# Patient Record
Sex: MALE | Race: WHITE | NOT HISPANIC OR LATINO | Employment: OTHER | URBAN - METROPOLITAN AREA
[De-identification: names, ages, dates, MRNs, and addresses within clinical notes are randomized per-mention and may not be internally consistent; named-entity substitution may affect disease eponyms.]

---

## 2017-06-27 ENCOUNTER — OFFICE VISIT (OUTPATIENT)
Dept: ORTHOPEDICS | Facility: OTHER | Age: 72
End: 2017-06-27
Attending: ORTHOPAEDIC SURGERY
Payer: COMMERCIAL

## 2017-06-27 VITALS
HEIGHT: 69 IN | TEMPERATURE: 97.5 F | DIASTOLIC BLOOD PRESSURE: 74 MMHG | SYSTOLIC BLOOD PRESSURE: 142 MMHG | HEART RATE: 73 BPM | BODY MASS INDEX: 25.77 KG/M2 | OXYGEN SATURATION: 93 % | RESPIRATION RATE: 18 BRPM | WEIGHT: 174 LBS

## 2017-06-27 DIAGNOSIS — M23.203 OLD TEAR OF MEDIAL MENISCUS OF RIGHT KNEE, UNSPECIFIED TEAR TYPE: Primary | ICD-10-CM

## 2017-06-27 PROCEDURE — 99212 OFFICE O/P EST SF 10 MIN: CPT | Mod: 25 | Performed by: ORTHOPAEDIC SURGERY

## 2017-06-27 PROCEDURE — 20610 DRAIN/INJ JOINT/BURSA W/O US: CPT | Mod: RT | Performed by: ORTHOPAEDIC SURGERY

## 2017-06-27 RX ORDER — DEXAMETHASONE SODIUM PHOSPHATE 4 MG/ML
4 INJECTION, SOLUTION INTRA-ARTICULAR; INTRALESIONAL; INTRAMUSCULAR; INTRAVENOUS; SOFT TISSUE ONCE
Qty: 1 ML | Refills: 0 | OUTPATIENT
Start: 2017-06-27 | End: 2017-06-27

## 2017-06-27 ASSESSMENT — PAIN SCALES - GENERAL: PAINLEVEL: MODERATE PAIN (5)

## 2017-06-27 NOTE — MR AVS SNAPSHOT
"              After Visit Summary   2017    Don Sosa    MRN: [id number]           Patient Information     Date Of Birth          1945        Visit Information        Provider Department      2017 11:20 AM Diaz Alexander MD [facility]        Today's Diagnoses     Old tear of medial meniscus of right knee, unspecified tear type    -  1       Follow-ups after your visit        Who to contact     If you have questions or need follow up information about today's clinic visit or your schedule please contact [facility] directly at [phone].  Normal or non-critical lab and imaging results will be communicated to you by [employer], letter or phone within 4 business days after the clinic has received the results. If you do not hear from us within 7 days, please contact the clinic through [employer] or phone. If you have a critical or abnormal lab result, we will notify you by phone as soon as possible.  Submit refill requests through [employer] or call your pharmacy and they will forward the refill request to us. Please allow 3 business days for your refill to be completed.          Additional Information About Your Visit        MyChart Information     [employer] lets you send messages to your doctor, view your test results, renew your prescriptions, schedule appointments and more. To sign up, go to [email or web address] . Click on \"Log in\" on the left side of the screen, which will take you to the Welcome page. Then click on \"Sign up Now\" on the right side of the page.     You will be asked to enter the access code listed below, as well as some personal information. Please follow the directions to create your username and password.     Your access code is: [email or web address]  Expires: 2017 11:55 AM     Your access code will  in 90 days. If you need help or a new code, please call your [facility] or [phone].        Care EveryWhere ID     This is your Care " "EveryWhere ID. This could be used by other organizations to access your [facility] medical records  [id number]        Your Vitals Were     Pulse Temperature Respirations Height Pulse Oximetry BMI (Body Mass Index)    73 97.5  F (36.4  C) (Tympanic) 18 5' 9\" (1.753 m) 93% 25.7 kg/m2       Blood Pressure from Last 3 Encounters:   06/27/17 142/74   08/16/16 124/84    Weight from Last 3 Encounters:   06/27/17 174 lb (78.9 kg)   08/16/16 170 lb (77.1 kg)              We Performed the Following     DEXAMETHASONE SODIUM PHOS PER 1 MG     Large Joint/Bursa injection and/or drainage (Shoulder, Knee)          Today's Medication Changes          These changes are accurate as of: 6/27/17 11:55 AM.  If you have any questions, ask your nurse or doctor.               These medicines have changed or have updated prescriptions.        Dose/Directions    * dexamethasone 4 MG/ML injection   Commonly known as:  DECADRON   This may have changed:  Another medication with the same name was added. Make sure you understand how and when to take each.   Used for:  Old tear of medial meniscus of right knee   Changed by:  Diaz Alexander MD        Dose:  4 mg   Inject 1 mL (4 mg) as directed once for 1 dose Use 4 mg or dose determined by provider for iontophoresis.   Quantity:  1 mL   Refills:  0       * dexamethasone 4 MG/ML injection   Commonly known as:  DECADRON   This may have changed:  You were already taking a medication with the same name, and this prescription was added. Make sure you understand how and when to take each.   Used for:  Old tear of medial meniscus of right knee, unspecified tear type   Changed by:  Diaz Alexander MD        Dose:  4 mg   Inject 1 mL (4 mg) as directed once for 1 dose Use 4 mg or dose determined by provider for iontophoresis.   Quantity:  1 mL   Refills:  0       * Notice:  This list has 2 medication(s) that are the same as other medications prescribed for you. Read the directions carefully, and " ask your doctor or other care provider to review them with you.         Where to get your medicines      Some of these will need a paper prescription and others can be bought over the counter.  Ask your nurse if you have questions.     You don't need a prescription for these medications     dexamethasone 4 MG/ML injection                Primary Care Provider    None Specified       No primary provider on file.        Equal Access to Services     ALICIA MARTIN : Hadangie Saxena, waaxda jaquelineadaha, qaybta nilamaoscar kay, tay carrera. So [facility] [phone].    ATENCIÓN: Si habla español, tiene a frost disposición servicios gratuitos de asistencia lingüística. Llame al [phone].    We comply with applicable federal civil rights laws and [place] laws. We do not discriminate on the basis of race, color, national origin, age, disability sex, sexual orientation or gender identity.            Thank you!     Thank you for choosing [facility]  for your care. Our goal is always to provide you with excellent care. Hearing back from our patients is one way we can continue to improve our services. Please take a few minutes to complete the written survey that you may receive in the mail after your visit with us. Thank you!             Your Updated Medication List - Protect others around you: Learn how to safely use, store and throw away your medicines at www.disposemymeds.org.          This list is accurate as of: 6/27/17 11:55 AM.  Always use your most recent med list.                   Brand Name Dispense Instructions for use Diagnosis    albuterol 108 (90 BASE) MCG/ACT Inhaler    PROAIR HFA/PROVENTIL HFA/VENTOLIN HFA     Inhale 2 puffs into the lungs every 6 hours        ASPIRIN PO      Take 81 mg by mouth daily        budesonide-formoterol 160-4.5 MCG/ACT Inhaler    SYMBICORT     Inhale 2 puffs into the lungs 2 times daily        * dexamethasone 4 MG/ML injection     DECADRON    1 mL    Inject 1 mL (4 mg) as directed once for 1 dose Use 4 mg or dose determined by provider for iontophoresis.    Old tear of medial meniscus of right knee       * dexamethasone 4 MG/ML injection    DECADRON    1 mL    Inject 1 mL (4 mg) as directed once for 1 dose Use 4 mg or dose determined by provider for iontophoresis.    Old tear of medial meniscus of right knee, unspecified tear type       MIRTAZAPINE PO      Take 15 mg by mouth daily        MONTELUKAST SODIUM PO      Take 10 mg by mouth daily        NITROSTAT SL      Place 0.4 mg under the tongue        PRIMIDONE PO      Take 250 mg by mouth 2 times daily        SIMVASTATIN PO      Take 40 mg by mouth daily        VITAMIN C PO      Take 500 mg by mouth daily        VITAMIN D (CHOLECALCIFEROL) PO      Take 1,000 Units by mouth daily        * Notice:  This list has 2 medication(s) that are the same as other medications prescribed for you. Read the directions carefully, and ask your doctor or other care provider to review them with you.

## 2017-06-27 NOTE — NURSING NOTE
"Chief Complaint   Patient presents with     Musculoskeletal Problem     Right knee pain       Initial /74 (BP Location: Right arm, Patient Position: Chair, Cuff Size: Adult Large)  Pulse 73  Temp 97.5  F (36.4  C) (Tympanic)  Resp 18  Ht 5' 9\" (1.753 m)  Wt 174 lb (78.9 kg)  SpO2 93%  BMI 25.7 kg/m2 Estimated body mass index is 25.7 kg/(m^2) as calculated from the following:    Height as of this encounter: 5' 9\" (1.753 m).    Weight as of this encounter: 174 lb (78.9 kg).  Medication Reconciliation: unable or not appropriate to perform   Gem Savage LPN      "

## 2017-06-27 NOTE — PROGRESS NOTES
Chief complaint: Medial meniscal tear right knee    Subjective: This 72-year-old right-handed retired  has a two-year history of right knee pain of insidious onset.  [facility] and [facility] which gave him long-lasting symptom relief.  He presented to me for the 1st time on 8/16/16.  X-rays at that time were negative.  [facility] was not available to me.  All he wanted was another steroid injection.  One was therefore performed.  He returns today stating that he got almost a year of symptom relief from it but the symptoms have returned and so he wants another injection. He denies a recent right knee.    His present pain is along the medial side of the knee, intermittent, provoked by weightbearing activities, and lessened by nonweightbearing.  The knee does not swell or give out.    Nothing is changed with respect to his musculoskeletal or neurologic review of systems since seen last.    Examination: Healthy-appearing male with appropriate mood and affect.  The skin around the right knee is intact.  The knee has no effusion or deformity.  There is no palpable lymph node or Baker cyst behind it.  It is tender to palpation only along the medial joint line.  Range of motion is 5-112  with mild discomfort at both extremes.  The knee is stable to ligamentous stressing.  Ary's maneuvers are negative.  Extension strength is normal.  The neurovascular status the right ankle is intact.    Impression: Chronic medial meniscal tear right knee    Plan: We will repeat the steroid injection today.  Return PRN. If this lasts him until next year, we will get new right knee films on arrival next year.    Procedure: After obtaining written informed consent and sterilely prepping the site a timeout was held.  Sterile technique was employed as the right knee was then injected with 4 mg of dexamethasone mixed with 3mL of Carbocaine.  Topical ethyl  chloride spray was used as a local anesthetic.  The patient tolerated the procedure well and there were no complications.

## 2017-11-29 ENCOUNTER — TRANSFERRED RECORDS (OUTPATIENT)
Dept: HEALTH INFORMATION MANAGEMENT | Facility: HOSPITAL | Age: 72
End: 2017-11-29

## 2017-11-30 ENCOUNTER — TRANSFERRED RECORDS (OUTPATIENT)
Dept: HEALTH INFORMATION MANAGEMENT | Facility: HOSPITAL | Age: 72
End: 2017-11-30

## 2017-12-05 ENCOUNTER — ANESTHESIA (OUTPATIENT)
Dept: SURGERY | Facility: HOSPITAL | Age: 72
End: 2017-12-05
Payer: MEDICARE

## 2017-12-05 ENCOUNTER — HOSPITAL ENCOUNTER (OUTPATIENT)
Facility: HOSPITAL | Age: 72
Discharge: HOME | End: 2017-12-05
Attending: OPHTHALMOLOGY | Admitting: OPHTHALMOLOGY
Payer: MEDICARE

## 2017-12-05 ENCOUNTER — ANESTHESIA EVENT (OUTPATIENT)
Dept: SURGERY | Facility: HOSPITAL | Age: 72
End: 2017-12-05
Payer: MEDICARE

## 2017-12-05 VITALS
BODY MASS INDEX: 27.08 KG/M2 | WEIGHT: 182.8 LBS | HEIGHT: 69 IN | TEMPERATURE: 98.2 F | OXYGEN SATURATION: 94 % | DIASTOLIC BLOOD PRESSURE: 81 MMHG | RESPIRATION RATE: 18 BRPM | SYSTOLIC BLOOD PRESSURE: 154 MMHG

## 2017-12-05 PROCEDURE — 25000125 ZZHC RX 250: Performed by: OPHTHALMOLOGY

## 2017-12-05 PROCEDURE — 37000009 ZZH ANESTHESIA TECHNICAL FEE, EACH ADDTL 15 MIN: Performed by: OPHTHALMOLOGY

## 2017-12-05 PROCEDURE — 99100 ANES PT EXTEME AGE<1 YR&>70: CPT | Performed by: NURSE ANESTHETIST, CERTIFIED REGISTERED

## 2017-12-05 PROCEDURE — 71000027 ZZH RECOVERY PHASE 2 EACH 15 MINS: Performed by: OPHTHALMOLOGY

## 2017-12-05 PROCEDURE — 66984 XCAPSL CTRC RMVL W/O ECP: CPT | Performed by: NURSE ANESTHETIST, CERTIFIED REGISTERED

## 2017-12-05 PROCEDURE — 37000008 ZZH ANESTHESIA TECHNICAL FEE, 1ST 30 MIN: Performed by: OPHTHALMOLOGY

## 2017-12-05 PROCEDURE — 27210794 ZZH OR GENERAL SUPPLY STERILE: Performed by: OPHTHALMOLOGY

## 2017-12-05 PROCEDURE — 36000056 ZZH SURGERY LEVEL 3 1ST 30 MIN: Performed by: OPHTHALMOLOGY

## 2017-12-05 PROCEDURE — 25000128 H RX IP 250 OP 636: Performed by: NURSE ANESTHETIST, CERTIFIED REGISTERED

## 2017-12-05 PROCEDURE — 40000305 ZZH STATISTIC PRE PROC ASSESS I: Performed by: OPHTHALMOLOGY

## 2017-12-05 PROCEDURE — 66984 XCAPSL CTRC RMVL W/O ECP: CPT | Performed by: ANESTHESIOLOGY

## 2017-12-05 PROCEDURE — V2632 POST CHMBR INTRAOCULAR LENS: HCPCS | Performed by: OPHTHALMOLOGY

## 2017-12-05 PROCEDURE — C9447 INJ, PHENYLEPHRINE KETOROLAC: HCPCS | Performed by: OPHTHALMOLOGY

## 2017-12-05 PROCEDURE — 36000058 ZZH SURGERY LEVEL 3 EA 15 ADDTL MIN: Performed by: OPHTHALMOLOGY

## 2017-12-05 PROCEDURE — 25000128 H RX IP 250 OP 636: Performed by: OPHTHALMOLOGY

## 2017-12-05 DEVICE — IMPLANTABLE DEVICE: Type: IMPLANTABLE DEVICE | Site: EYE | Status: FUNCTIONAL

## 2017-12-05 RX ORDER — FLURBIPROFEN SODIUM 0.3 MG/ML
1 SOLUTION/ DROPS OPHTHALMIC
Status: DISCONTINUED | OUTPATIENT
Start: 2017-12-05 | End: 2017-12-05 | Stop reason: HOSPADM

## 2017-12-05 RX ORDER — CYCLOPENTOLATE HYDROCHLORIDE 10 MG/ML
1 SOLUTION/ DROPS OPHTHALMIC
Status: COMPLETED | OUTPATIENT
Start: 2017-12-05 | End: 2017-12-05

## 2017-12-05 RX ORDER — TETRACAINE HYDROCHLORIDE 5 MG/ML
SOLUTION OPHTHALMIC PRN
Status: DISCONTINUED | OUTPATIENT
Start: 2017-12-05 | End: 2017-12-05 | Stop reason: HOSPADM

## 2017-12-05 RX ORDER — MOXIFLOXACIN 5 MG/ML
1 SOLUTION/ DROPS OPHTHALMIC
Status: ACTIVE | OUTPATIENT
Start: 2017-12-05 | End: 2017-12-05

## 2017-12-05 RX ORDER — MEPERIDINE HYDROCHLORIDE 25 MG/ML
12.5 INJECTION INTRAMUSCULAR; INTRAVENOUS; SUBCUTANEOUS
Status: DISCONTINUED | OUTPATIENT
Start: 2017-12-05 | End: 2017-12-05 | Stop reason: HOSPADM

## 2017-12-05 RX ORDER — MOXIFLOXACIN 5 MG/ML
1 SOLUTION/ DROPS OPHTHALMIC ONCE
Status: COMPLETED | OUTPATIENT
Start: 2017-12-05 | End: 2017-12-05

## 2017-12-05 RX ORDER — SODIUM CHLORIDE, SODIUM LACTATE, POTASSIUM CHLORIDE, CALCIUM CHLORIDE 600; 310; 30; 20 MG/100ML; MG/100ML; MG/100ML; MG/100ML
INJECTION, SOLUTION INTRAVENOUS CONTINUOUS
Status: DISCONTINUED | OUTPATIENT
Start: 2017-12-05 | End: 2017-12-05 | Stop reason: HOSPADM

## 2017-12-05 RX ORDER — PHENYLEPHRINE HYDROCHLORIDE 25 MG/ML
1 SOLUTION/ DROPS OPHTHALMIC
Status: COMPLETED | OUTPATIENT
Start: 2017-12-05 | End: 2017-12-05

## 2017-12-05 RX ORDER — CYCLOPENTOLATE HYDROCHLORIDE 10 MG/ML
1 SOLUTION/ DROPS OPHTHALMIC
Status: DISCONTINUED | OUTPATIENT
Start: 2017-12-05 | End: 2017-12-05 | Stop reason: HOSPADM

## 2017-12-05 RX ORDER — PHENYLEPHRINE HYDROCHLORIDE 25 MG/ML
1 SOLUTION/ DROPS OPHTHALMIC
Status: DISCONTINUED | OUTPATIENT
Start: 2017-12-05 | End: 2017-12-05 | Stop reason: HOSPADM

## 2017-12-05 RX ORDER — PROPARACAINE HYDROCHLORIDE 5 MG/ML
1 SOLUTION/ DROPS OPHTHALMIC ONCE
Status: COMPLETED | OUTPATIENT
Start: 2017-12-05 | End: 2017-12-05

## 2017-12-05 RX ORDER — FLURBIPROFEN SODIUM 0.3 MG/ML
1 SOLUTION/ DROPS OPHTHALMIC ONCE
Status: COMPLETED | OUTPATIENT
Start: 2017-12-05 | End: 2017-12-05

## 2017-12-05 RX ORDER — PREDNISOLONE ACETATE 10 MG/ML
SUSPENSION/ DROPS OPHTHALMIC PRN
Status: DISCONTINUED | OUTPATIENT
Start: 2017-12-05 | End: 2017-12-05 | Stop reason: HOSPADM

## 2017-12-05 RX ORDER — ONDANSETRON 2 MG/ML
4 INJECTION INTRAMUSCULAR; INTRAVENOUS EVERY 30 MIN PRN
Status: DISCONTINUED | OUTPATIENT
Start: 2017-12-05 | End: 2017-12-05 | Stop reason: HOSPADM

## 2017-12-05 RX ORDER — PROPARACAINE HYDROCHLORIDE 5 MG/ML
1 SOLUTION/ DROPS OPHTHALMIC ONCE
Status: DISCONTINUED | OUTPATIENT
Start: 2017-12-05 | End: 2017-12-05 | Stop reason: HOSPADM

## 2017-12-05 RX ORDER — NALOXONE HYDROCHLORIDE 0.4 MG/ML
.1-.4 INJECTION, SOLUTION INTRAMUSCULAR; INTRAVENOUS; SUBCUTANEOUS
Status: DISCONTINUED | OUTPATIENT
Start: 2017-12-05 | End: 2017-12-05 | Stop reason: HOSPADM

## 2017-12-05 RX ORDER — ONDANSETRON 4 MG/1
4 TABLET, ORALLY DISINTEGRATING ORAL EVERY 30 MIN PRN
Status: DISCONTINUED | OUTPATIENT
Start: 2017-12-05 | End: 2017-12-05 | Stop reason: HOSPADM

## 2017-12-05 RX ADMIN — CYCLOPENTOLATE HYDROCHLORIDE 1 DROP: 10 SOLUTION/ DROPS OPHTHALMIC at 07:39

## 2017-12-05 RX ADMIN — MOXIFLOXACIN HYDROCHLORIDE 1 DROP: 5 SOLUTION/ DROPS OPHTHALMIC at 07:36

## 2017-12-05 RX ADMIN — MIDAZOLAM HYDROCHLORIDE 1 MG: 1 INJECTION, SOLUTION INTRAMUSCULAR; INTRAVENOUS at 08:02

## 2017-12-05 RX ADMIN — PROPARACAINE HYDROCHLORIDE 1 DROP: 5 SOLUTION/ DROPS OPHTHALMIC at 07:36

## 2017-12-05 RX ADMIN — FLURBIPROFEN SODIUM 1 DROP: 0.3 SOLUTION/ DROPS OPHTHALMIC at 07:38

## 2017-12-05 RX ADMIN — PHENYLEPHRINE HYDROCHLORIDE 1 DROP: 25 SOLUTION/ DROPS OPHTHALMIC at 07:39

## 2017-12-05 RX ADMIN — PHENYLEPHRINE HYDROCHLORIDE 1 DROP: 25 SOLUTION/ DROPS OPHTHALMIC at 07:44

## 2017-12-05 RX ADMIN — CYCLOPENTOLATE HYDROCHLORIDE 1 DROP: 10 SOLUTION/ DROPS OPHTHALMIC at 07:45

## 2017-12-05 ASSESSMENT — COPD QUESTIONNAIRES: COPD: 1

## 2017-12-05 ASSESSMENT — LIFESTYLE VARIABLES: TOBACCO_USE: 1

## 2017-12-05 NOTE — OP NOTE
Ophthalmology Operative Note    DATE OF SERVICE:  12/5/2017.     PREOPERATIVE DIAGNOSIS: Nuclear sclerotic cataract, Left eye.       POSTOPERATIVE DIAGNOSIS: Nuclear sclerotic cataract, Left eye.       SURGEON: Ludin Kruse MD    PROCEDURE:  Phacoemulsification with intraocular lens implant, Model PCB00, 14.00 diopter power.     ANESTHESIA:  Topical with IV sedation. Combined MAC with Topical.       INDICATION: Don Sosa is a 72 year old male with a history of a visually significant cataract of the Left eye that is interfering with activities of daily living.      DESCRIPTION OF PROCEDURE:   Operative risks, benefits and alternatives to the procedure were discussed at length with Don Sosa including infection, bleeding, retinal detachment, loss of vision and loss of the eye.  Patient voiced understanding and informed consent was signed.  The patient was taken to the operating suite, where an appropriate level of anesthesia was given.  Attention was placed to the operative eye.  The patient was then prepped and draped in the usual sterile ophthalmic manner with 5% povidone iodine solution.  A lid speculum was placed in the eye to provide exposure and MVR blade was used to make a paracentesis.  Once this was performed, Shugarcaine was then placed intracamerally.  This was then followed by intracameral Viscoat.  A 2.4 mm blade was then used to make a biplanar temporal clear corneal incision.  A cystotome and uttrata were used to make a continuous curvilinear capsulorrhexis. Balanced salt solution on flat cannula was then used to hydrodissect the lens.  Phacoemulsification was then performed in a divide-and-conquer technique to remove all pieces of the nucleus.  Irrigation aspiration was then used to remove all remaining cortical material and debris.  Amvisc was then used to fill the capsular bag.  A PCB00 14.00 diopter lens was then injected into the capsular bag using the injector for a target  refraction of -0.49.  Once this was performed, a secondary instrument was used to rotate the lens into proper position.  Irrigation aspiration was then used to remove all remaining viscoelastic material and center the lens appropriately.  Balanced salt solution on 30 gauge cannula was then used to hydrate both wounds.  A Weck-Yolie was used to assess intraocular pressure and wound leakage.  Once this was stable and the lens was found to be in good position, the patient was undraped.  The patient was brought to the recovery area in stable condition.  Family was made aware of the patient's condition and the patient will follow up with us later this afternoon.  No complications.     IMPLANTS:    Implant Name Type Inv. Item Serial No.  Lot No. LRB No. Used   LENS-TECNIS PCB00 14.0 PRELOADED   LENS-TECNIS PCB00 14.0 PRELOADED [id number]     Left 1

## 2017-12-05 NOTE — IP AVS SNAPSHOT
[facility] Operating Room    [address] [place] [id number]    Phone:  [phone]                                       After Visit Summary   12/5/2017    Don Sosa    MRN: [id number]           After Visit Summary Signature Page     I have received my discharge instructions, and my questions have been answered. I have discussed any challenges I see with this plan with the nurse or doctor.    ..........................................................................................................................................  Patient/Patient Representative Signature      ..........................................................................................................................................  Patient Representative Print Name and Relationship to Patient    ..................................................               ................................................  Date                                            Time    ..........................................................................................................................................  Reviewed by Signature/Title    ...................................................              ..............................................  Date                                                            Time

## 2017-12-05 NOTE — DISCHARGE INSTRUCTIONS
Post-Anesthesia Patient Instructions    IMMEDIATELY FOLLOWING SURGERY:  Do not drive or operate machinery for the first twenty four hours after surgery.  Do not make any important decisions for twenty four hours after surgery or while taking narcotic pain medications or sedatives.  If you develop intractable nausea and vomiting or a severe headache please notify your doctor immediately.    FOLLOW-UP:  Please make an appointment with your surgeon as instructed. You do not need to follow up with anesthesia unless specifically instructed to do so.    WOUND CARE INSTRUCTIONS (if applicable):  Keep a dry clean dressing on the anesthesia/puncture wound site if there is drainage.  Once the wound has quit draining you may leave it open to air.  Generally you should leave the bandage intact for twenty four hours unless there is drainage.  If the epidural site drains for more than 36-48 hours please call the anesthesia department.    QUESTIONS?:  Please feel free to call your physician or the hospital  if you have any questions, and they will be happy to assist you.   AFTER CATARACT SURGERY RESTRICTIONS  SHIELD: WEAR OVER SURGICAL EYE AT NIGHT FOR 1 WEEK  ACTIVITY/LIFTING: Avoid activities, which increase abdominal/head pressure such as pulling on a starter cord, heavy lifting (over 15-20 lbs) or bending with the head below the waist, for 1 week. Avoid sudden jerky movements (chopping, shoveling) for 1 week. Waking and lower body exercise such as biking is okay.   WATER: Showering/washing hair is okay the day after surgery, but avoid excess water, soap, or shampoo in your eyes. Clean eyelids gently with a clean, wet washcloth as needed. Avoid pressure on the eye.   SUNLIGHT: If the eye is light sensitive after surgery, dark glasses may be worn.   DIET/MEDICATIONS: Resume you usual diet and medications your family doctor ehas prescribed. Continue to use/follow the instructions for your eye drops.   DRIVING: Avoid  driving with a patch. Resume driving when you feel safe, but don't drive on the day of surgery.  PAIN: Minor pain and itching is normal during healing. DO NOT RUB THE EYE! Report any unusual swelling, increased discharge or pain that is not relieved by Tylenol (or equivalent). If sudden drop/change in vision call immediately. [facility] [phone]  CONTINUE TO USE ALL THE EYE DROPS PER THE INSTRUCTIONS ORDERED BY DR. JONES'S TECHNICIANS  FOR EMERGENCY DR. ALFONSO: [phone]  FOLLOW EYE DROP INSTRUCTIONS GIVEN BY 's OFFICE

## 2017-12-05 NOTE — ANESTHESIA CARE TRANSFER NOTE
Patient: Don Sosa    Procedure(s):  CATARACT EXTRACTION LEFT EYE WITH IMPLANT - Wound Class: I-Clean    Diagnosis: CATARACT LEFT EYE  Diagnosis Additional Information: No value filed.    Anesthesia Type:   MAC     Note:  Airway :Nasal Cannula  Patient transferred to:Phase II  Handoff Report: Identifed the Patient, Identified the Reponsible Provider, Reviewed the pertinent medical history, Discussed the surgical course, Reviewed Intra-OP anesthesia mangement and issues during anesthesia, Set expectations for post-procedure period and Allowed opportunity for questions and acknowledgement of understanding      Vitals: (Last set prior to Anesthesia Care Transfer)    CRNA VITALS  12/5/2017 0809 - 12/5/2017 0842      12/5/2017             Pulse: 60    Ht Rate: 59    SpO2: 97 %    Resp Rate (set): 8                Electronically Signed By: JULIENNE Sellers CRNA  December 5, 2017  8:42 AM

## 2017-12-05 NOTE — IP AVS SNAPSHOT
MRN:[id number]                      After Visit Summary   12/5/2017    Don Sosa    MRN: [id number]           Thank you!     Thank you for choosing [facility] for your care. Our goal is always to provide you with excellent care. Hearing back from our patients is one way we can continue to improve our services. Please take a few minutes to complete the written survey that you may receive in the mail after you visit with us. Thank you!        Patient Information     Date Of Birth          1945        About your hospital stay     You were admitted on:  December 5, 2017 You last received care in the:  [facility]    You were discharged on:  December 5, 2017       Who to Call     For medical emergencies, please call 911.  For non-urgent questions about your medical care, please call your primary care provider or clinic, [phone]  For questions related to your surgery, please call your surgery clinic        Attending Provider     Provider Specialty    Ludin Kruse MD Ophthalmology       Primary Care Provider Office Phone # Fax #    Jamal Bhardwaj [phone]      Further instructions from your care team           Post-Anesthesia Patient Instructions    IMMEDIATELY FOLLOWING SURGERY:  Do not drive or operate machinery for the first twenty four hours after surgery.  Do not make any important decisions for twenty four hours after surgery or while taking narcotic pain medications or sedatives.  If you develop intractable nausea and vomiting or a severe headache please notify your doctor immediately.    FOLLOW-UP:  Please make an appointment with your surgeon as instructed. You do not need to follow up with anesthesia unless specifically instructed to do so.    WOUND CARE INSTRUCTIONS (if applicable):  Keep a dry clean dressing on the anesthesia/puncture wound site if there is drainage.  Once the wound has quit draining you may leave it open to air.  Generally  you should leave the bandage intact for twenty four hours unless there is drainage.  If the epidural site drains for more than 36-48 hours please call the anesthesia department.    QUESTIONS?:  Please feel free to call your physician or the hospital  if you have any questions, and they will be happy to assist you.   AFTER CATARACT SURGERY RESTRICTIONS  SHIELD: WEAR OVER SURGICAL EYE AT NIGHT FOR 1 WEEK  ACTIVITY/LIFTING: Avoid activities, which increase abdominal/head pressure such as pulling on a starter cord, heavy lifting (over 15-20 lbs) or bending with the head below the waist, for 1 week. Avoid sudden jerky movements (chopping, shoveling) for 1 week. Waking and lower body exercise such as biking is okay.   WATER: Showering/washing hair is okay the day after surgery, but avoid excess water, soap, or shampoo in your eyes. Clean eyelids gently with a clean, wet washcloth as needed. Avoid pressure on the eye.   SUNLIGHT: If the eye is light sensitive after surgery, dark glasses may be worn.   DIET/MEDICATIONS: Resume you usual diet and medications your family doctor ehas prescribed. Continue to use/follow the instructions for your eye drops.   DRIVING: Avoid driving with a patch. Resume driving when you feel safe, but don't drive on the day of surgery.  PAIN: Minor pain and itching is normal during healing. DO NOT RUB THE EYE! Report any unusual swelling, increased discharge or pain that is not relieved by Tylenol (or equivalent). If sudden drop/change in vision call immediately. [facility] [phone]  CONTINUE TO USE ALL THE EYE DROPS PER THE INSTRUCTIONS ORDERED BY DR. JONES'S TECHNICIANS  FOR EMERGENCY DR. ALFONSO: [phone]  FOLLOW EYE DROP INSTRUCTIONS GIVEN BY 's OFFICE      Pending Results     No orders found from 12/3/2017 to 12/6/2017.            Admission Information     Date & Time Provider Department Dept. Phone    12/5/2017 Ludin Alfonso MD [facility]  "[phone]      Your Vitals Were     Blood Pressure Temperature Respirations Height Weight Pulse Oximetry    146/87 98.1  F (36.7  C) (Oral) 18 1.753 m (5' 9\") 82.9 kg (182 lb 12.8 oz) 92%    BMI (Body Mass Index)                   26.99 kg/m2           [employer] Information     [employer] lets you send messages to your doctor, view your test results, renew your prescriptions, schedule appointments and more. To sign up, go to [email or web address] . Click on \"Log in\" on the left side of the screen, which will take you to the Welcome page. Then click on \"Sign up Now\" on the right side of the page.     You will be asked to enter the access code listed below, as well as some personal information. Please follow the directions to create your username and password.     Your access code is: [email or web address]  Expires: 3/5/2018  8:26 AM     Your access code will  in 90 days. If you need help or a new code, please call your [facility] [place] clinic or [phone].        Care EveryWhere ID     This is your Care EveryWhere ID. This could be used by other organizations to access your [facility] medical records  [id number]        Equal Access to Services     ALICIA MARTIN AH: Dominick page [place], waaxda luqadaha, qaybta kaalmada adeegyada, tay carrera. So [facility] [phone].    ATENCIÓN: Si habla español, tiene a frost disposición servicios gratuitos de asistencia lingüística. Llame al [phone].    We comply with applicable federal civil rights laws and [place] laws. We do not discriminate on the basis of race, color, national origin, age, disability, sex, sexual orientation, or gender identity.               Review of your medicines      UNREVIEWED medicines. Ask your doctor about these medicines        Dose / Directions    albuterol 108 (90 BASE) MCG/ACT Inhaler   Commonly known as:  PROAIR HFA/PROVENTIL HFA/VENTOLIN HFA        Dose:  2 puff   Inhale 2 puffs into the lungs every 6 hours "   Refills:  0       ASPIRIN PO        Dose:  81 mg   Take 81 mg by mouth daily   Refills:  0       budesonide-formoterol 160-4.5 MCG/ACT Inhaler   Commonly known as:  SYMBICORT        Dose:  2 puff   Inhale 2 puffs into the lungs 2 times daily   Refills:  0       dexamethasone 4 MG/ML injection   Commonly known as:  DECADRON   Used for:  Old tear of medial meniscus of right knee, unspecified tear type        Dose:  4 mg   Inject 1 mL (4 mg) as directed once for 1 dose Use 4 mg or dose determined by provider for iontophoresis.   Quantity:  1 mL   Refills:  0       MIRTAZAPINE PO        Dose:  15 mg   Take 15 mg by mouth daily   Refills:  0       MONTELUKAST SODIUM PO        Dose:  10 mg   Take 10 mg by mouth daily   Refills:  0       NITROSTAT SL        Dose:  0.4 mg   Place 0.4 mg under the tongue   Refills:  0       PRIMIDONE PO        Dose:  250 mg   Take 250 mg by mouth 2 times daily   Refills:  0       SIMVASTATIN PO        Dose:  40 mg   Take 40 mg by mouth daily   Refills:  0       VITAMIN D (CHOLECALCIFEROL) PO        Dose:  1000 Units   Take 1,000 Units by mouth daily   Refills:  0                Protect others around you: Learn how to safely use, store and throw away your medicines at www.disposemymeds.org.             Medication List: This is a list of all your medications and when to take them. Check marks below indicate your daily home schedule. Keep this list as a reference.      Medications           Morning Afternoon Evening Bedtime As Needed    albuterol 108 (90 BASE) MCG/ACT Inhaler   Commonly known as:  PROAIR HFA/PROVENTIL HFA/VENTOLIN HFA   Inhale 2 puffs into the lungs every 6 hours                                ASPIRIN PO   Take 81 mg by mouth daily                                budesonide-formoterol 160-4.5 MCG/ACT Inhaler   Commonly known as:  SYMBICORT   Inhale 2 puffs into the lungs 2 times daily                                dexamethasone 4 MG/ML injection   Commonly known as:  DECADRON    Inject 1 mL (4 mg) as directed once for 1 dose Use 4 mg or dose determined by provider for iontophoresis.                                MIRTAZAPINE PO   Take 15 mg by mouth daily                                MONTELUKAST SODIUM PO   Take 10 mg by mouth daily                                NITROSTAT SL   Place 0.4 mg under the tongue                                PRIMIDONE PO   Take 250 mg by mouth 2 times daily                                SIMVASTATIN PO   Take 40 mg by mouth daily                                VITAMIN D (CHOLECALCIFEROL) PO   Take 1,000 Units by mouth daily

## 2017-12-05 NOTE — ANESTHESIA PREPROCEDURE EVALUATION
Anesthesia Evaluation     . Pt has had prior anesthetic.     No history of anesthetic complications          ROS/MED HX    ENT/Pulmonary:     (+)tobacco use, Past use quit >10 years ago packs/day  COPD, , . .    Neurologic:     (+)Parkinson's disease other neuro Hearing Loss    Cardiovascular:     (+) Dyslipidemia, hypertension-range: not on beta blocker, -CAD, -past MI,-. : . . . :. .       METS/Exercise Tolerance:     Hematologic:  - neg hematologic  ROS       Musculoskeletal:   (+) arthritis, , , -       GI/Hepatic:     (+) GERD       Renal/Genitourinary:  - ROS Renal section negative       Endo:  - neg endo ROS       Psychiatric:  - neg psychiatric ROS       Infectious Disease:  - neg infectious disease ROS       Malignancy:      - no malignancy   Other:    - neg other ROS                 Physical Exam  Normal systems: cardiovascular and pulmonary    Airway   Mallampati: III  TM distance: <3 FB  Neck ROM: full    Dental   (+) upper dentures and lower dentures    Cardiovascular   Rhythm and rate: regular and normal      Pulmonary    breath sounds clear to auscultation                    Anesthesia Plan      History & Physical Review  History and physical reviewed and following examination; no interval change.    ASA Status:  3 .    NPO Status:  > 8 hours    Plan for MAC with Other induction. Maintenance will be Other.  Reason for MAC:  Deep or markedly invasive procedure (G8), Procedure to face, neck, head or breast and Chronic cardiopulmonary disease (G9)  PONV prophylaxis:  Ondansetron (or other 5HT-3) and Dexamethasone or Solumedrol       Postoperative Care  Postoperative pain management:  Oral pain medications and Multi-modal analgesia.      Consents  Anesthetic plan, risks, benefits and alternatives discussed with:  Patient..                          .

## 2017-12-05 NOTE — ANESTHESIA POSTPROCEDURE EVALUATION
Patient: Don Sosa    Procedure(s):  CATARACT EXTRACTION LEFT EYE WITH IMPLANT - Wound Class: I-Clean    Diagnosis:CATARACT LEFT EYE  Diagnosis Additional Information: No value filed.    Anesthesia Type:  MAC    Note:  Anesthesia Post Evaluation    Patient location during evaluation: Phase 2 and Bedside  Patient participation: Able to fully participate in evaluation  Level of consciousness: awake and alert  Pain management: adequate  Airway patency: patent  Cardiovascular status: acceptable  Respiratory status: acceptable  Hydration status: stable  PONV: none     Anesthetic complications: None          Last vitals:  Vitals:    12/05/17 0905 12/05/17 0910 12/05/17 0915   BP: 155/75 162/91 154/81   Resp: 18 18 18   Temp:   98.2  F (36.8  C)   SpO2: 95% 96% 94%         Electronically Signed By: Renan Dumont MD  December 5, 2017  10:04 AM

## 2017-12-05 NOTE — OR NURSING
Up w/o vertigo.Took coffee and cookies w/o nausea.Patient and responsible adult given discharge instructions with no questions regarding instructions. Nicole score 20. Pain level 0/10.  Discharged from unit via walking. Patient discharged to home.

## 2017-12-12 ENCOUNTER — TRANSFERRED RECORDS (OUTPATIENT)
Dept: HEALTH INFORMATION MANAGEMENT | Facility: HOSPITAL | Age: 72
End: 2017-12-12

## 2018-01-02 ENCOUNTER — ANESTHESIA EVENT (OUTPATIENT)
Dept: SURGERY | Facility: HOSPITAL | Age: 73
End: 2018-01-02
Payer: MEDICARE

## 2018-01-02 ENCOUNTER — ANESTHESIA (OUTPATIENT)
Dept: SURGERY | Facility: HOSPITAL | Age: 73
End: 2018-01-02
Payer: MEDICARE

## 2018-01-02 ENCOUNTER — HOSPITAL ENCOUNTER (OUTPATIENT)
Facility: HOSPITAL | Age: 73
Discharge: HOME | End: 2018-01-02
Attending: OPHTHALMOLOGY | Admitting: OPHTHALMOLOGY
Payer: MEDICARE

## 2018-01-02 VITALS
TEMPERATURE: 97.6 F | HEIGHT: 69 IN | BODY MASS INDEX: 26.66 KG/M2 | OXYGEN SATURATION: 95 % | SYSTOLIC BLOOD PRESSURE: 153 MMHG | DIASTOLIC BLOOD PRESSURE: 81 MMHG | WEIGHT: 180 LBS

## 2018-01-02 PROCEDURE — 25000125 ZZHC RX 250: Performed by: OPHTHALMOLOGY

## 2018-01-02 PROCEDURE — 40000306 ZZH STATISTIC PRE PROC ASSESS II: Performed by: OPHTHALMOLOGY

## 2018-01-02 PROCEDURE — 37000009 ZZH ANESTHESIA TECHNICAL FEE, EACH ADDTL 15 MIN: Performed by: OPHTHALMOLOGY

## 2018-01-02 PROCEDURE — 37000008 ZZH ANESTHESIA TECHNICAL FEE, 1ST 30 MIN: Performed by: OPHTHALMOLOGY

## 2018-01-02 PROCEDURE — 71000027 ZZH RECOVERY PHASE 2 EACH 15 MINS: Performed by: OPHTHALMOLOGY

## 2018-01-02 PROCEDURE — 36000058 ZZH SURGERY LEVEL 3 EA 15 ADDTL MIN: Performed by: OPHTHALMOLOGY

## 2018-01-02 PROCEDURE — V2632 POST CHMBR INTRAOCULAR LENS: HCPCS | Performed by: OPHTHALMOLOGY

## 2018-01-02 PROCEDURE — 36000056 ZZH SURGERY LEVEL 3 1ST 30 MIN: Performed by: OPHTHALMOLOGY

## 2018-01-02 PROCEDURE — 25000128 H RX IP 250 OP 636: Performed by: ANESTHESIOLOGY

## 2018-01-02 PROCEDURE — 25000128 H RX IP 250 OP 636: Performed by: NURSE ANESTHETIST, CERTIFIED REGISTERED

## 2018-01-02 PROCEDURE — 66984 XCAPSL CTRC RMVL W/O ECP: CPT | Performed by: ANESTHESIOLOGY

## 2018-01-02 PROCEDURE — 66984 XCAPSL CTRC RMVL W/O ECP: CPT | Performed by: NURSE ANESTHETIST, CERTIFIED REGISTERED

## 2018-01-02 PROCEDURE — C9447 INJ, PHENYLEPHRINE KETOROLAC: HCPCS | Performed by: OPHTHALMOLOGY

## 2018-01-02 PROCEDURE — 27210794 ZZH OR GENERAL SUPPLY STERILE: Performed by: OPHTHALMOLOGY

## 2018-01-02 PROCEDURE — 25000128 H RX IP 250 OP 636: Performed by: OPHTHALMOLOGY

## 2018-01-02 PROCEDURE — 99100 ANES PT EXTEME AGE<1 YR&>70: CPT | Performed by: NURSE ANESTHETIST, CERTIFIED REGISTERED

## 2018-01-02 DEVICE — IMPLANTABLE DEVICE: Type: IMPLANTABLE DEVICE | Site: EYE | Status: FUNCTIONAL

## 2018-01-02 RX ORDER — DEXAMETHASONE SODIUM PHOSPHATE 4 MG/ML
4 INJECTION, SOLUTION INTRA-ARTICULAR; INTRALESIONAL; INTRAMUSCULAR; INTRAVENOUS; SOFT TISSUE EVERY 10 MIN PRN
Status: DISCONTINUED | OUTPATIENT
Start: 2018-01-02 | End: 2018-01-02 | Stop reason: HOSPADM

## 2018-01-02 RX ORDER — FLURBIPROFEN SODIUM 0.3 MG/ML
1 SOLUTION/ DROPS OPHTHALMIC
Status: DISCONTINUED | OUTPATIENT
Start: 2018-01-02 | End: 2018-01-02 | Stop reason: HOSPADM

## 2018-01-02 RX ORDER — LABETALOL HYDROCHLORIDE 5 MG/ML
10 INJECTION, SOLUTION INTRAVENOUS
Status: DISCONTINUED | OUTPATIENT
Start: 2018-01-02 | End: 2018-01-02 | Stop reason: HOSPADM

## 2018-01-02 RX ORDER — FENTANYL CITRATE 50 UG/ML
25-50 INJECTION, SOLUTION INTRAMUSCULAR; INTRAVENOUS
Status: DISCONTINUED | OUTPATIENT
Start: 2018-01-02 | End: 2018-01-02 | Stop reason: HOSPADM

## 2018-01-02 RX ORDER — PREDNISOLONE ACETATE 10 MG/ML
SUSPENSION/ DROPS OPHTHALMIC PRN
Status: DISCONTINUED | OUTPATIENT
Start: 2018-01-02 | End: 2018-01-02 | Stop reason: HOSPADM

## 2018-01-02 RX ORDER — PHENYLEPHRINE HYDROCHLORIDE 25 MG/ML
1 SOLUTION/ DROPS OPHTHALMIC
Status: DISCONTINUED | OUTPATIENT
Start: 2018-01-02 | End: 2018-01-02 | Stop reason: HOSPADM

## 2018-01-02 RX ORDER — PROMETHAZINE HYDROCHLORIDE 25 MG/ML
12.5 INJECTION, SOLUTION INTRAMUSCULAR; INTRAVENOUS
Status: DISCONTINUED | OUTPATIENT
Start: 2018-01-02 | End: 2018-01-02 | Stop reason: HOSPADM

## 2018-01-02 RX ORDER — OXYCODONE HYDROCHLORIDE 5 MG/1
5 TABLET ORAL EVERY 4 HOURS PRN
Status: DISCONTINUED | OUTPATIENT
Start: 2018-01-02 | End: 2018-01-02 | Stop reason: HOSPADM

## 2018-01-02 RX ORDER — MOXIFLOXACIN 5 MG/ML
SOLUTION/ DROPS OPHTHALMIC PRN
Status: DISCONTINUED | OUTPATIENT
Start: 2018-01-02 | End: 2018-01-02 | Stop reason: HOSPADM

## 2018-01-02 RX ORDER — ONDANSETRON 4 MG/1
4 TABLET, ORALLY DISINTEGRATING ORAL EVERY 30 MIN PRN
Status: DISCONTINUED | OUTPATIENT
Start: 2018-01-02 | End: 2018-01-02 | Stop reason: HOSPADM

## 2018-01-02 RX ORDER — MEPERIDINE HYDROCHLORIDE 25 MG/ML
12.5 INJECTION INTRAMUSCULAR; INTRAVENOUS; SUBCUTANEOUS
Status: DISCONTINUED | OUTPATIENT
Start: 2018-01-02 | End: 2018-01-02 | Stop reason: HOSPADM

## 2018-01-02 RX ORDER — TETRACAINE HYDROCHLORIDE 5 MG/ML
SOLUTION OPHTHALMIC PRN
Status: DISCONTINUED | OUTPATIENT
Start: 2018-01-02 | End: 2018-01-02 | Stop reason: HOSPADM

## 2018-01-02 RX ORDER — LABETALOL HYDROCHLORIDE 5 MG/ML
15 INJECTION, SOLUTION INTRAVENOUS ONCE
Status: COMPLETED | OUTPATIENT
Start: 2018-01-02 | End: 2018-01-02

## 2018-01-02 RX ORDER — NALOXONE HYDROCHLORIDE 0.4 MG/ML
.1-.4 INJECTION, SOLUTION INTRAMUSCULAR; INTRAVENOUS; SUBCUTANEOUS
Status: DISCONTINUED | OUTPATIENT
Start: 2018-01-02 | End: 2018-01-02 | Stop reason: HOSPADM

## 2018-01-02 RX ORDER — PROPARACAINE HYDROCHLORIDE 5 MG/ML
1 SOLUTION/ DROPS OPHTHALMIC ONCE
Status: COMPLETED | OUTPATIENT
Start: 2018-01-02 | End: 2018-01-02

## 2018-01-02 RX ORDER — CYCLOPENTOLATE HYDROCHLORIDE 10 MG/ML
1 SOLUTION/ DROPS OPHTHALMIC
Status: DISCONTINUED | OUTPATIENT
Start: 2018-01-02 | End: 2018-01-02 | Stop reason: HOSPADM

## 2018-01-02 RX ORDER — HYDRALAZINE HYDROCHLORIDE 20 MG/ML
2.5-5 INJECTION INTRAMUSCULAR; INTRAVENOUS EVERY 10 MIN PRN
Status: DISCONTINUED | OUTPATIENT
Start: 2018-01-02 | End: 2018-01-02 | Stop reason: HOSPADM

## 2018-01-02 RX ORDER — MOXIFLOXACIN 5 MG/ML
1 SOLUTION/ DROPS OPHTHALMIC
Status: ACTIVE | OUTPATIENT
Start: 2018-01-02 | End: 2018-01-02

## 2018-01-02 RX ORDER — ONDANSETRON 2 MG/ML
4 INJECTION INTRAMUSCULAR; INTRAVENOUS EVERY 30 MIN PRN
Status: DISCONTINUED | OUTPATIENT
Start: 2018-01-02 | End: 2018-01-02 | Stop reason: HOSPADM

## 2018-01-02 RX ORDER — ALBUTEROL SULFATE 0.83 MG/ML
2.5 SOLUTION RESPIRATORY (INHALATION) EVERY 4 HOURS PRN
Status: DISCONTINUED | OUTPATIENT
Start: 2018-01-02 | End: 2018-01-02 | Stop reason: HOSPADM

## 2018-01-02 RX ADMIN — MOXIFLOXACIN HYDROCHLORIDE 1 DROP: 5 SOLUTION/ DROPS OPHTHALMIC at 07:26

## 2018-01-02 RX ADMIN — LABETALOL HYDROCHLORIDE 15 MG: 5 INJECTION, SOLUTION INTRAVENOUS at 07:50

## 2018-01-02 RX ADMIN — PHENYLEPHRINE HYDROCHLORIDE 1 DROP: 25 SOLUTION/ DROPS OPHTHALMIC at 07:27

## 2018-01-02 RX ADMIN — CYCLOPENTOLATE HYDROCHLORIDE 1 DROP: 10 SOLUTION/ DROPS OPHTHALMIC at 07:37

## 2018-01-02 RX ADMIN — PROPARACAINE HYDROCHLORIDE 1 DROP: 5 SOLUTION/ DROPS OPHTHALMIC at 07:23

## 2018-01-02 RX ADMIN — FLURBIPROFEN SODIUM 1 DROP: 0.3 SOLUTION/ DROPS OPHTHALMIC at 07:25

## 2018-01-02 RX ADMIN — MIDAZOLAM 1 MG: 1 INJECTION INTRAMUSCULAR; INTRAVENOUS at 08:20

## 2018-01-02 RX ADMIN — PHENYLEPHRINE HYDROCHLORIDE 1 DROP: 25 SOLUTION/ DROPS OPHTHALMIC at 07:37

## 2018-01-02 RX ADMIN — CYCLOPENTOLATE HYDROCHLORIDE 1 DROP: 10 SOLUTION/ DROPS OPHTHALMIC at 07:24

## 2018-01-02 ASSESSMENT — LIFESTYLE VARIABLES: TOBACCO_USE: 1

## 2018-01-02 ASSESSMENT — COPD QUESTIONNAIRES: COPD: 1

## 2018-01-02 NOTE — ANESTHESIA PREPROCEDURE EVALUATION
Anesthesia Evaluation     . Pt has had prior anesthetic.     No history of anesthetic complications          ROS/MED HX    ENT/Pulmonary:     (+)tobacco use, Past use COPD, , . .    Neurologic:     (+)Parkinson's disease other neuro Hearing Loss     Cardiovascular:     (+) Dyslipidemia, hypertension-range:  not on beta blocker, -CAD, -past MI,-. : . . . :. .       METS/Exercise Tolerance:     Hematologic:  - neg hematologic  ROS       Musculoskeletal:   (+) arthritis, , , -       GI/Hepatic:     (+) GERD       Renal/Genitourinary:  - ROS Renal section negative       Endo:  - neg endo ROS       Psychiatric:  - neg psychiatric ROS       Infectious Disease:  - neg infectious disease ROS       Malignancy:      - no malignancy   Other:    - neg other ROS                 Physical Exam      Airway   Mallampati: IV  TM distance: >3 FB  Neck ROM: full    Dental   (+) upper dentures, lower dentures and missing    Cardiovascular   Rhythm and rate: regular and normal      Pulmonary    breath sounds clear to auscultation                    Anesthesia Plan      History & Physical Review  History and physical reviewed and following examination; no interval change.    ASA Status:  3 .        Plan for MAC with Other induction. Maintenance will be Other.  Reason for MAC:  Chronic cardiopulmonary disease (G9), Deep or markedly invasive procedure (G8) and Procedure to face, neck, head or breast  PONV prophylaxis:  Ondansetron (or other 5HT-3) and Dexamethasone or Solumedrol       Postoperative Care  Postoperative pain management:  IV analgesics, Oral pain medications and Multi-modal analgesia.      Consents  Anesthetic plan, risks, benefits and alternatives discussed with:  Patient..                          .

## 2018-01-02 NOTE — OP NOTE
Ophthalmology Operative Note    DATE OF SERVICE:  1/2/2018.     PREOPERATIVE DIAGNOSIS:     Cataract, Right eye.       POSTOPERATIVE DIAGNOSIS:  Cataract, Right eye.       SURGEON: Ludin Kruse MD    PROCEDURE:  Phacoemulsification with intraocular lens implant, Model PCB00, 13.50 diopter power.     ANESTHESIA:  Topical with IV sedation. Combined MAC with Topical.       INDICATION: Don Sosa is a 72 year old male with a history of a visually significant cataract of the Right eye that is interfering with activities of daily living.      DESCRIPTION OF PROCEDURE:   Operative risks, benefits and alternatives to the procedure were discussed at length with Don Sosa including infection, bleeding, retinal detachment, loss of vision and loss of the eye.  Patient voiced understanding and informed consent was signed.  The patient was taken to the operating suite, where an appropriate level of anesthesia was given.  Attention was placed to the operative eye.  The patient was then prepped and draped in the usual sterile ophthalmic manner with 5% povidone iodine solution.  A lid speculum was placed in the eye to provide exposure and MVR blade was used to make a paracentesis.  Once this was performed, Shugarcaine was then placed intracamerally.  This was then followed by intracameral Viscoat.  A 2.4 mm blade was then used to make a biplanar temporal clear corneal incision.  A cystotome and uttrata were used to make a continuous curvilinear capsulorrhexis. Balanced salt solution on flat cannula was then used to hydrodissect the lens.  Phacoemulsification was then performed in a divide-and-conquer technique to remove all pieces of the nucleus.  Irrigation aspiration was then used to remove all remaining cortical material and debris.  Amvisc was then used to fill the capsular bag.  A PCB00 13.50 diopter lens was then injected into the capsular bag using the injector for a target refraction of -0.49.  Once this was  performed, a secondary instrument was used to rotate the lens into proper position.  Irrigation aspiration was then used to remove all remaining viscoelastic material and center the lens appropriately.  Balanced salt solution on 30 gauge cannula was then used to hydrate both wounds.  A Weck-Yolie was used to assess intraocular pressure and wound leakage.  Once this was stable and the lens was found to be in good position, the patient was undraped.  The patient was brought to the recovery area in stable condition.  Family was made aware of the patient's condition and the patient will follow up with us later this afternoon.  No complications.     IMPLANTS:    Implant Name Type Inv. Item Serial No.  Lot No. LRB No. Used   LENS-TECNIS PCB00 13.5 PRELOADED   LENS-TECNIS PCB00 13.5 PRELOADED [id number]     Right 1

## 2018-01-02 NOTE — IP AVS SNAPSHOT
MRN:[id number]                      After Visit Summary   1/2/2018    Don Sosa    MRN: [id number]           Thank you!     Thank you for choosing [facility] for your care. Our goal is always to provide you with excellent care. Hearing back from our patients is one way we can continue to improve our services. Please take a few minutes to complete the written survey that you may receive in the mail after you visit with us. Thank you!        Patient Information     Date Of Birth          1945        About your hospital stay     You were admitted on:  January 2, 2018 You last received care in the:  [facility]    You were discharged on:  January 2, 2018       Who to Call     For medical emergencies, please call 911.  For non-urgent questions about your medical care, please call your primary care provider or clinic, [phone]  For questions related to your surgery, please call your surgery clinic        Attending Provider     Provider Specialty    Ludin Kruse MD Ophthalmology       Primary Care Provider Office Phone # Fax #    Jamal Bhardwaj [phone]      Further instructions from your care team           Post-Anesthesia Patient Instructions    IMMEDIATELY FOLLOWING SURGERY:  Do not drive or operate machinery for the first twenty four hours after surgery.  Do not make any important decisions for twenty four hours after surgery or while taking narcotic pain medications or sedatives.  If you develop intractable nausea and vomiting or a severe headache please notify your doctor immediately.    FOLLOW-UP:  Please make an appointment with your surgeon as instructed. You do not need to follow up with anesthesia unless specifically instructed to do so.    WOUND CARE INSTRUCTIONS (if applicable):  Keep a dry clean dressing on the anesthesia/puncture wound site if there is drainage.  Once the wound has quit draining you may leave it open to air.  Generally you  should leave the bandage intact for twenty four hours unless there is drainage.  If the epidural site drains for more than 36-48 hours please call the anesthesia department.    QUESTIONS?:  Please feel free to call your physician or the hospital  if you have any questions, and they will be happy to assist you.   AFTER CATARACT SURGERY RESTRICTIONS  SHIELD: WEAR OVER SURGICAL EYE AT NIGHT FOR 1 WEEK  ACTIVITY/LIFTING: Avoid activities, which increase abdominal/head pressure such as pulling on a starter cord, heavy lifting (over 15-20 lbs) or bending with the head below the waist, for 1 week. Avoid sudden jerky movements (chopping, shoveling) for 1 week. Waking and lower body exercise such as biking is okay.   WATER: Showering/washing hair is okay the day after surgery, but avoid excess water, soap, or shampoo in your eyes. Clean eyelids gently with a clean, wet washcloth as needed. Avoid pressure on the eye.   SUNLIGHT: If the eye is light sensitive after surgery, dark glasses may be worn.   DIET/MEDICATIONS: Resume you usual diet and medications your family doctor ehas prescribed. Continue to use/follow the instructions for your eye drops.   DRIVING: Avoid driving with a patch. Resume driving when you feel safe, but don't drive on the day of surgery.  PAIN: Minor pain and itching is normal during healing. DO NOT RUB THE EYE! Report any unusual swelling, increased discharge or pain that is not relieved by Tylenol (or equivalent). If sudden drop/change in vision call immediately. [facility] [phone]  CONTINUE TO USE ALL THE EYE DROPS PER THE INSTRUCTIONS ORDERED BY DR. JONES'S TECHNICIANS  FOR EMERGENCY DR. ALFONSO: [phone]  FOLLOW EYE DROP INSTRUCTIONS GIVEN BY 's OFFICE      Pending Results     No orders found from 12/31/2017 to 1/3/2018.            Admission Information     Date & Time Provider Department Dept. Phone    1/2/2018 Ludin Alfonso MD [facility] Preop/Phase II  "[phone]      Your Vitals Were     Blood Pressure Temperature Height Weight Pulse Oximetry BMI (Body Mass Index)    157/93 97.6  F (36.4  C) (Oral) 1.753 m (5' 9\") 81.6 kg (180 lb) 94% 26.58 kg/m2      [facility] [employer] Information     [facility] lets you send messages to your doctor, view your test results, renew your prescriptions, schedule appointments and more. To sign up, go to [email or web address] . Click on \"Log in\" on the left side of the screen, which will take you to the Welcome page. Then click on \"Sign up Now\" on the right side of the page.     You will be asked to enter the access code listed below, as well as some personal information. Please follow the directions to create your username and password.     Your access code is: [email or web address]  Expires: 3/5/2018  8:26 AM     Your access code will  in 90 days. If you need help or a new code, please call your [facility] clinic or [phone].        Care EveryWhere ID     This is your Care EveryWhere ID. This could be used by other organizations to access your [facility] medical records  [id number]        Equal Access to Services     ALICIA MARTIN AH: Dominick Saxena, waaxda luqadaha, qaybta kaalmada adeegyada, tay carrera. So [facility] [phone].    ATENCIÓN: Si habla español, tiene a frost disposición servicios gratuitos de asistencia lingüística. Llame al [phone].    We comply with applicable federal civil rights laws and [place] laws. We do not discriminate on the basis of race, color, national origin, age, disability, sex, sexual orientation, or gender identity.               Review of your medicines      UNREVIEWED medicines. Ask your doctor about these medicines        Dose / Directions    albuterol 108 (90 BASE) MCG/ACT Inhaler   Commonly known as:  PROAIR HFA/PROVENTIL HFA/VENTOLIN HFA        Dose:  2 puff   Inhale 2 puffs into the lungs every 6 hours   Refills:  0       ASPIRIN PO        Dose:  81 mg   Take " 81 mg by mouth daily   Refills:  0       budesonide-formoterol 160-4.5 MCG/ACT Inhaler   Commonly known as:  SYMBICORT        Dose:  2 puff   Inhale 2 puffs into the lungs 2 times daily   Refills:  0       dexamethasone 4 MG/ML injection   Commonly known as:  DECADRON   Used for:  Old tear of medial meniscus of right knee, unspecified tear type        Dose:  4 mg   Inject 1 mL (4 mg) as directed once for 1 dose Use 4 mg or dose determined by provider for iontophoresis.   Quantity:  1 mL   Refills:  0       MIRTAZAPINE PO        Dose:  15 mg   Take 15 mg by mouth daily   Refills:  0       MONTELUKAST SODIUM PO        Dose:  10 mg   Take 10 mg by mouth daily   Refills:  0       NITROSTAT SL        Dose:  0.4 mg   Place 0.4 mg under the tongue   Refills:  0       PRIMIDONE PO        Dose:  250 mg   Take 250 mg by mouth 2 times daily   Refills:  0       SIMVASTATIN PO        Dose:  40 mg   Take 40 mg by mouth daily   Refills:  0       VITAMIN D (CHOLECALCIFEROL) PO        Dose:  1000 Units   Take 1,000 Units by mouth daily   Refills:  0                Protect others around you: Learn how to safely use, store and throw away your medicines at www.disposemymeds.org.             Medication List: This is a list of all your medications and when to take them. Check marks below indicate your daily home schedule. Keep this list as a reference.      Medications           Morning Afternoon Evening Bedtime As Needed    albuterol 108 (90 BASE) MCG/ACT Inhaler   Commonly known as:  PROAIR HFA/PROVENTIL HFA/VENTOLIN HFA   Inhale 2 puffs into the lungs every 6 hours                                ASPIRIN PO   Take 81 mg by mouth daily                                budesonide-formoterol 160-4.5 MCG/ACT Inhaler   Commonly known as:  SYMBICORT   Inhale 2 puffs into the lungs 2 times daily                                dexamethasone 4 MG/ML injection   Commonly known as:  DECADRON   Inject 1 mL (4 mg) as directed once for 1 dose Use 4 mg  or dose determined by provider for iontophoresis.                                MIRTAZAPINE PO   Take 15 mg by mouth daily                                MONTELUKAST SODIUM PO   Take 10 mg by mouth daily                                NITROSTAT SL   Place 0.4 mg under the tongue                                PRIMIDONE PO   Take 250 mg by mouth 2 times daily                                SIMVASTATIN PO   Take 40 mg by mouth daily                                VITAMIN D (CHOLECALCIFEROL) PO   Take 1,000 Units by mouth daily

## 2018-01-02 NOTE — IP AVS SNAPSHOT
[facility]    [address] [address]    Phone:  [phone]                                       After Visit Summary   1/2/2018    Don Sosa    MRN: [id number]           After Visit Summary Signature Page     I have received my discharge instructions, and my questions have been answered. I have discussed any challenges I see with this plan with the nurse or doctor.    ..........................................................................................................................................  Patient/Patient Representative Signature      ..........................................................................................................................................  Patient Representative Print Name and Relationship to Patient    ..................................................               ................................................  Date                                            Time    ..........................................................................................................................................  Reviewed by Signature/Title    ...................................................              ..............................................  Date                                                            Time

## 2018-01-02 NOTE — DISCHARGE INSTRUCTIONS
Post-Anesthesia Patient Instructions    IMMEDIATELY FOLLOWING SURGERY:  Do not drive or operate machinery for the first twenty four hours after surgery.  Do not make any important decisions for twenty four hours after surgery or while taking narcotic pain medications or sedatives.  If you develop intractable nausea and vomiting or a severe headache please notify your doctor immediately.    FOLLOW-UP:  Please make an appointment with your surgeon as instructed. You do not need to follow up with anesthesia unless specifically instructed to do so.    WOUND CARE INSTRUCTIONS (if applicable):  Keep a dry clean dressing on the anesthesia/puncture wound site if there is drainage.  Once the wound has quit draining you may leave it open to air.  Generally you should leave the bandage intact for twenty four hours unless there is drainage.  If the epidural site drains for more than 36-48 hours please call the anesthesia department.    QUESTIONS?:  Please feel free to call your physician or the hospital  if you have any questions, and they will be happy to assist you.   AFTER CATARACT SURGERY RESTRICTIONS  SHIELD: WEAR OVER SURGICAL EYE AT NIGHT FOR 1 WEEK  ACTIVITY/LIFTING: Avoid activities, which increase abdominal/head pressure such as pulling on a starter cord, heavy lifting (over 15-20 lbs) or bending with the head below the waist, for 1 week. Avoid sudden jerky movements (chopping, shoveling) for 1 week. Waking and lower body exercise such as biking is okay.   WATER: Showering/washing hair is okay the day after surgery, but avoid excess water, soap, or shampoo in your eyes. Clean eyelids gently with a clean, wet washcloth as needed. Avoid pressure on the eye.   SUNLIGHT: If the eye is light sensitive after surgery, dark glasses may be worn.   DIET/MEDICATIONS: Resume you usual diet and medications your family doctor ehas prescribed. Continue to use/follow the instructions for your eye drops.   DRIVING: Avoid  driving with a patch. Resume driving when you feel safe, but don't drive on the day of surgery.  PAIN: Minor pain and itching is normal during healing. DO NOT RUB THE EYE! Report any unusual swelling, increased discharge or pain that is not relieved by Tylenol (or equivalent). If sudden drop/change in vision call immediately. [facility] [phone]  CONTINUE TO USE ALL THE EYE DROPS PER THE INSTRUCTIONS ORDERED BY DR. JONES'S TECHNICIANS  FOR EMERGENCY DR. ALFONSO: [phone]  FOLLOW EYE DROP INSTRUCTIONS GIVEN BY 's OFFICE

## 2018-01-02 NOTE — OR NURSING
Pateint discharged to home .  Nicole score 20. Pain level 0/10.  Discharged from unit via walking .

## 2018-01-02 NOTE — ANESTHESIA POSTPROCEDURE EVALUATION
Patient: Don Sosa    Procedure(s):  CATARACT RIGHT EYE - Wound Class: I-Clean    Diagnosis:CATARACT RIGHT EYE  Diagnosis Additional Information: No value filed.    Anesthesia Type:  MAC    Note:  Anesthesia Post Evaluation    Patient location during evaluation: Phase 2 and Bedside  Patient participation: Able to fully participate in evaluation  Level of consciousness: awake and alert  Pain management: adequate  Airway patency: patent  Cardiovascular status: acceptable  Respiratory status: acceptable  Hydration status: stable  PONV: none     Anesthetic complications: None          Last vitals:  Vitals:    01/02/18 0905 01/02/18 0910 01/02/18 0913   BP: 165/86 153/81    Temp:      SpO2: 95% 96% 95%         Electronically Signed By: Renan Dumont MD  January 2, 2018  11:02 AM

## 2018-01-02 NOTE — ANESTHESIA CARE TRANSFER NOTE
Patient: Don Sosa    Procedure(s):  CATARACT RIGHT EYE - Wound Class: I-Clean    Diagnosis: CATARACT RIGHT EYE  Diagnosis Additional Information: No value filed.    Anesthesia Type:   MAC     Note:  Airway :Nasal Cannula  Patient transferred to:Phase II  Handoff Report: Identifed the Patient, Identified the Reponsible Provider, Reviewed the pertinent medical history, Discussed the surgical course, Reviewed Intra-OP anesthesia mangement and issues during anesthesia, Set expectations for post-procedure period and Allowed opportunity for questions and acknowledgement of understanding      Vitals: (Last set prior to Anesthesia Care Transfer)    CRNA VITALS  1/2/2018 0817 - 1/2/2018 0854      1/2/2018             Pulse: 63    SpO2: 100 %    Resp Rate (observed): (!)  1    Resp Rate (set): 8                Electronically Signed By: JULIENNE Tate CRNA  January 2, 2018  8:54 AM

## 2021-08-06 ENCOUNTER — APPOINTMENT (OUTPATIENT)
Dept: CT IMAGING | Facility: HOSPITAL | Age: 76
End: 2021-08-06
Attending: INTERNAL MEDICINE
Payer: COMMERCIAL

## 2021-08-06 ENCOUNTER — HOSPITAL ENCOUNTER (EMERGENCY)
Facility: HOSPITAL | Age: 76
Discharge: ANOTHER HOSPITAL | End: 2021-08-07
Attending: INTERNAL MEDICINE | Admitting: INTERNAL MEDICINE
Payer: COMMERCIAL

## 2021-08-06 DIAGNOSIS — R04.0 NASAL BLEEDING: ICD-10-CM

## 2021-08-06 DIAGNOSIS — S09.90XA INJURY OF HEAD, INITIAL ENCOUNTER: ICD-10-CM

## 2021-08-06 DIAGNOSIS — S02.92XA MULTIPLE CLOSED FRACTURES OF FACIAL BONE, INITIAL ENCOUNTER (H): ICD-10-CM

## 2021-08-06 PROCEDURE — 250N000009 HC RX 250: Performed by: INTERNAL MEDICINE

## 2021-08-06 PROCEDURE — 30901 CONTROL OF NOSEBLEED: CPT | Mod: 50 | Performed by: INTERNAL MEDICINE

## 2021-08-06 PROCEDURE — 30901 CONTROL OF NOSEBLEED: CPT | Mod: 50

## 2021-08-06 PROCEDURE — 258N000003 HC RX IP 258 OP 636: Performed by: INTERNAL MEDICINE

## 2021-08-06 PROCEDURE — 250N000011 HC RX IP 250 OP 636: Performed by: INTERNAL MEDICINE

## 2021-08-06 PROCEDURE — 99285 EMERGENCY DEPT VISIT HI MDM: CPT | Mod: 25 | Performed by: INTERNAL MEDICINE

## 2021-08-06 PROCEDURE — 99285 EMERGENCY DEPT VISIT HI MDM: CPT | Mod: 25

## 2021-08-06 PROCEDURE — C9046 COCAINE HCL NASAL SOLUTION: HCPCS | Performed by: INTERNAL MEDICINE

## 2021-08-06 PROCEDURE — 70486 CT MAXILLOFACIAL W/O DYE: CPT

## 2021-08-06 PROCEDURE — 70450 CT HEAD/BRAIN W/O DYE: CPT

## 2021-08-06 RX ORDER — COCAINE HYDROCHLORIDE 40 MG/ML
SOLUTION NASAL ONCE
Status: COMPLETED | OUTPATIENT
Start: 2021-08-06 | End: 2021-08-06

## 2021-08-06 RX ORDER — TRANEXAMIC ACID 100 MG/ML
500 INJECTION, SOLUTION INTRAVENOUS ONCE
Status: COMPLETED | OUTPATIENT
Start: 2021-08-06 | End: 2021-08-06

## 2021-08-06 RX ADMIN — SODIUM CHLORIDE 1000 ML: 9 INJECTION, SOLUTION INTRAVENOUS at 23:59

## 2021-08-06 RX ADMIN — COCAINE HYDROCHLORIDE: 40 SOLUTION NASAL at 20:34

## 2021-08-06 RX ADMIN — TRANEXAMIC ACID 500 MG: 1 INJECTION, SOLUTION INTRAVENOUS at 22:30

## 2021-08-06 ASSESSMENT — ENCOUNTER SYMPTOMS
PALPITATIONS: 0
HEADACHES: 0
DYSURIA: 0
LIGHT-HEADEDNESS: 0
FLANK PAIN: 0
VOMITING: 0
LOSS OF CONSCIOUSNESS: 0
NECK PAIN: 0
NUMBNESS: 0
MYALGIAS: 0
COLOR CHANGE: 0
CHEST TIGHTNESS: 0
CONFUSION: 0
DIFFICULTY BREATHING: 0
FREQUENCY: 0
VOICE CHANGE: 0
ABDOMINAL DISTENTION: 0
DIZZINESS: 0
COUGH: 0
DIAPHORESIS: 0
NAUSEA: 0
ANAL BLEEDING: 0
SHORTNESS OF BREATH: 0
CHILLS: 0
BLOOD IN STOOL: 0
WHEEZING: 0
BACK PAIN: 0
ABDOMINAL PAIN: 0
FEVER: 0
WEAKNESS: 0
SLEEP DISTURBANCE: 0

## 2021-08-07 VITALS
TEMPERATURE: 99.4 F | SYSTOLIC BLOOD PRESSURE: 137 MMHG | HEART RATE: 100 BPM | RESPIRATION RATE: 22 BRPM | DIASTOLIC BLOOD PRESSURE: 65 MMHG | OXYGEN SATURATION: 93 %

## 2021-08-07 LAB
ABO/RH(D): NORMAL
ALBUMIN SERPL-MCNC: 3.3 G/DL (ref 3.4–5)
ALP SERPL-CCNC: 75 U/L (ref 40–150)
ALT SERPL W P-5'-P-CCNC: 35 U/L (ref 0–70)
ANION GAP SERPL CALCULATED.3IONS-SCNC: 11 MMOL/L (ref 3–14)
ANTIBODY SCREEN: NEGATIVE
AST SERPL W P-5'-P-CCNC: 33 U/L (ref 0–45)
BASOPHILS # BLD AUTO: 0 10E3/UL (ref 0–0.2)
BASOPHILS NFR BLD AUTO: 0 %
BILIRUB SERPL-MCNC: 0.4 MG/DL (ref 0.2–1.3)
BUN SERPL-MCNC: 23 MG/DL (ref 7–30)
CALCIUM SERPL-MCNC: 8 MG/DL (ref 8.5–10.1)
CHLORIDE BLD-SCNC: 98 MMOL/L (ref 94–109)
CO2 SERPL-SCNC: 22 MMOL/L (ref 20–32)
CREAT SERPL-MCNC: 0.83 MG/DL (ref 0.66–1.25)
EOSINOPHIL # BLD AUTO: 0 10E3/UL (ref 0–0.7)
EOSINOPHIL NFR BLD AUTO: 0 %
ERYTHROCYTE [DISTWIDTH] IN BLOOD BY AUTOMATED COUNT: 14.8 % (ref 10–15)
GFR SERPL CREATININE-BSD FRML MDRD: 85 ML/MIN/1.73M2
GLUCOSE BLD-MCNC: 105 MG/DL (ref 70–99)
HCT VFR BLD AUTO: 34.2 % (ref 40–53)
HGB BLD-MCNC: 11.6 G/DL (ref 13.3–17.7)
HOLD SPECIMEN: NORMAL
IMM GRANULOCYTES # BLD: 0.1 10E3/UL
IMM GRANULOCYTES NFR BLD: 1 %
INR PPP: 0.95 (ref 0.85–1.15)
LYMPHOCYTES # BLD AUTO: 1.3 10E3/UL (ref 0.8–5.3)
LYMPHOCYTES NFR BLD AUTO: 11 %
MCH RBC QN AUTO: 34.7 PG (ref 26.5–33)
MCHC RBC AUTO-ENTMCNC: 33.9 G/DL (ref 31.5–36.5)
MCV RBC AUTO: 102 FL (ref 78–100)
MONOCYTES # BLD AUTO: 0.9 10E3/UL (ref 0–1.3)
MONOCYTES NFR BLD AUTO: 8 %
NEUTROPHILS # BLD AUTO: 9.2 10E3/UL (ref 1.6–8.3)
NEUTROPHILS NFR BLD AUTO: 80 %
NRBC # BLD AUTO: 0 10E3/UL
NRBC BLD AUTO-RTO: 0 /100
PLATELET # BLD AUTO: 216 10E3/UL (ref 150–450)
POTASSIUM BLD-SCNC: 3.9 MMOL/L (ref 3.4–5.3)
PROT SERPL-MCNC: 6.7 G/DL (ref 6.8–8.8)
RBC # BLD AUTO: 3.34 10E6/UL (ref 4.4–5.9)
SODIUM SERPL-SCNC: 131 MMOL/L (ref 133–144)
SPECIMEN EXPIRATION DATE: NORMAL
WBC # BLD AUTO: 11.6 10E3/UL (ref 4–11)

## 2021-08-07 PROCEDURE — 250N000011 HC RX IP 250 OP 636: Performed by: INTERNAL MEDICINE

## 2021-08-07 PROCEDURE — 82040 ASSAY OF SERUM ALBUMIN: CPT | Performed by: INTERNAL MEDICINE

## 2021-08-07 PROCEDURE — 36415 COLL VENOUS BLD VENIPUNCTURE: CPT | Performed by: INTERNAL MEDICINE

## 2021-08-07 PROCEDURE — C9046 COCAINE HCL NASAL SOLUTION: HCPCS | Performed by: INTERNAL MEDICINE

## 2021-08-07 PROCEDURE — 94640 AIRWAY INHALATION TREATMENT: CPT

## 2021-08-07 PROCEDURE — 86900 BLOOD TYPING SEROLOGIC ABO: CPT | Performed by: INTERNAL MEDICINE

## 2021-08-07 PROCEDURE — 250N000009 HC RX 250: Performed by: INTERNAL MEDICINE

## 2021-08-07 PROCEDURE — 96375 TX/PRO/DX INJ NEW DRUG ADDON: CPT

## 2021-08-07 PROCEDURE — 85610 PROTHROMBIN TIME: CPT | Performed by: INTERNAL MEDICINE

## 2021-08-07 PROCEDURE — 96374 THER/PROPH/DIAG INJ IV PUSH: CPT

## 2021-08-07 PROCEDURE — 85025 COMPLETE CBC W/AUTO DIFF WBC: CPT | Performed by: INTERNAL MEDICINE

## 2021-08-07 RX ORDER — MORPHINE SULFATE 2 MG/ML
2 INJECTION, SOLUTION INTRAMUSCULAR; INTRAVENOUS ONCE
Status: COMPLETED | OUTPATIENT
Start: 2021-08-07 | End: 2021-08-07

## 2021-08-07 RX ORDER — IPRATROPIUM BROMIDE AND ALBUTEROL SULFATE 2.5; .5 MG/3ML; MG/3ML
3 SOLUTION RESPIRATORY (INHALATION) ONCE
Status: COMPLETED | OUTPATIENT
Start: 2021-08-07 | End: 2021-08-07

## 2021-08-07 RX ORDER — TRANEXAMIC ACID 10 MG/ML
1 INJECTION, SOLUTION INTRAVENOUS ONCE
Status: COMPLETED | OUTPATIENT
Start: 2021-08-07 | End: 2021-08-07

## 2021-08-07 RX ORDER — COCAINE HYDROCHLORIDE 40 MG/ML
SOLUTION NASAL ONCE
Status: COMPLETED | OUTPATIENT
Start: 2021-08-07 | End: 2021-08-07

## 2021-08-07 RX ADMIN — TRANEXAMIC ACID 1 G: 10 INJECTION, SOLUTION INTRAVENOUS at 00:45

## 2021-08-07 RX ADMIN — COCAINE HYDROCHLORIDE: 40 SOLUTION NASAL at 01:50

## 2021-08-07 RX ADMIN — MORPHINE SULFATE 2 MG: 2 INJECTION, SOLUTION INTRAMUSCULAR; INTRAVENOUS at 03:08

## 2021-08-07 RX ADMIN — IPRATROPIUM BROMIDE AND ALBUTEROL SULFATE 3 ML: .5; 3 SOLUTION RESPIRATORY (INHALATION) at 04:19

## 2021-08-07 NOTE — ED NOTES
Called to the room with a report that the pt had a large bloody emesis in the garbage can. Pt did and reports that he continues to feel nauseated. Provider notified. Pt continues to bleed and bleeding increases when he pt sits up or moves around.

## 2021-08-07 NOTE — ED NOTES
Pt continues to bleed but bleeding has slowed greatly. Pt does report that there is a small amount of blood that continues to go down the back of the throat. Pt currently has an ice pack to his face. Wife remains at bedside. Call light in hand.

## 2021-08-07 NOTE — ED NOTES
Previous cotton balls removed from both nares. Bleeding started from both nares immediately after packing removed. Bilateral nares packed with cotton balls and the tranexamic acid per Dr. Molina.

## 2021-08-07 NOTE — ED NOTES
Pt reports that he lost his balance at home and landed on the living room floor flat on his face. Pt denies LOC, denies neck pain, denies back pain, or pain in any extremities. Pt is able to move all extremities without difficulty or pain. Pt denies double or blurred vision, denies being dizzy or lightheaded. Facial trauma noted and nose is currently bleeding from both nares.

## 2021-08-07 NOTE — ED NOTES
Pt insisted he use the bathroom vs the urinal.  Pt was assisted to standing at the bedside where pt decided he would be okay with using he urinal. Bleeding from the nose is more profuse when sitting up or standing, any activity increases the bleeding. While using the urinal pt did report that he felt weak, and he was assisted back into the bed.

## 2021-08-07 NOTE — ED PROVIDER NOTES
History     Chief Complaint   Patient presents with     Facial Injury     bruising bilateral eyes, nose bleeding, lt lower arm skin tear. c/o h/a. denies neck pain or loss of consciousness. notes poor balance and fell in his living room a couple of hours ago     The history is provided by the patient.   Trauma  Mechanism of injury: fall  Injury location: face  Injury location detail: face  Incident location: home     Fall:       Fall occurred: tripped       Impact surface: hard floor       Point of impact: face    EMS/PTA data:       Responsiveness: alert       Oriented to: person, place and situation       Loss of consciousness: no    Current symptoms:       Pain scale: 3/10       Pain quality: dull       Associated symptoms:             Denies abdominal pain, back pain, chest pain, difficulty breathing, headache, loss of consciousness, nausea, neck pain and vomiting.         Allergies:  Allergies   Allergen Reactions     Sulfa Drugs        Problem List:    There are no problems to display for this patient.       Past Medical History:    No past medical history on file.    Past Surgical History:    Past Surgical History:   Procedure Laterality Date     COLONOSCOPY - HIM SCAN  11/07/2008    Colonoscopy and polypectomy  11/7/2008     COLONOSCOPY - HIM SCAN  02/08/2013    Colonoscopy. Dr. Edwards, [facility]-multiple polyps 2/8/2013     PHACOEMULSIFICATION WITH STANDARD INTRAOCULAR LENS IMPLANT Left 12/5/2017    Procedure: PHACOEMULSIFICATION WITH STANDARD INTRAOCULAR LENS IMPLANT;  CATARACT EXTRACTION LEFT EYE WITH IMPLANT;  Surgeon: Ludin Kruse MD;  Location: [facility] OR     PHACOEMULSIFICATION WITH STANDARD INTRAOCULAR LENS IMPLANT Right 1/2/2018    Procedure: PHACOEMULSIFICATION WITH STANDARD INTRAOCULAR LENS IMPLANT;  CATARACT RIGHT EYE;  Surgeon: Ludin Kruse MD;  Location: [facility] OR       Family History:    No family history on file.    Social History:  Marital Status:   [2]  Social History     Tobacco  Use     Smoking status: Former Smoker     Tobacco comment: quit 26 years ago   Substance Use Topics     Alcohol use: Not on file     Drug use: Not on file        Medications:    albuterol (PROAIR HFA, PROVENTIL HFA, VENTOLIN HFA) 108 (90 BASE) MCG/ACT inhaler  ASPIRIN PO  budesonide-formoterol (SYMBICORT) 160-4.5 MCG/ACT inhaler  MIRTAZAPINE PO  MONTELUKAST SODIUM PO  PRIMIDONE PO  SIMVASTATIN PO  VITAMIN D, CHOLECALCIFEROL, PO  dexamethasone (DECADRON) 4 MG/ML injection  Nitroglycerin (NITROSTAT SL)          Review of Systems   Constitutional: Negative for chills, diaphoresis and fever.   HENT: Positive for nosebleeds. Negative for voice change.    Eyes: Negative for visual disturbance.   Respiratory: Negative for cough, chest tightness, shortness of breath and wheezing.    Cardiovascular: Negative for chest pain, palpitations and leg swelling.   Gastrointestinal: Negative for abdominal distention, abdominal pain, anal bleeding, blood in stool, nausea and vomiting.   Genitourinary: Negative for decreased urine volume, dysuria, flank pain and frequency.   Musculoskeletal: Negative for back pain, gait problem, myalgias and neck pain.   Skin: Negative for color change, pallor and rash.   Neurological: Negative for dizziness, loss of consciousness, syncope, weakness, light-headedness, numbness and headaches.   Psychiatric/Behavioral: Negative for confusion, sleep disturbance and suicidal ideas.       Physical Exam   BP: 154/88  Pulse: 91  Temp: (!) 96.6  F (35.9  C)  Resp: 18  SpO2: 98 %      Physical Exam  Vitals and nursing note reviewed.   Constitutional:       Appearance: He is well-developed.   HENT:      Head: Normocephalic. Raccoon eyes and contusion present.      Nose: Nasal deformity, signs of injury and nasal tenderness present.      Right Nostril: Epistaxis present.      Left Nostril: Epistaxis present.   Eyes:      Conjunctiva/sclera: Conjunctivae normal.      Pupils: Pupils are equal, round, and reactive  to light.   Neck:      Thyroid: No thyromegaly.      Vascular: No JVD.      Trachea: No tracheal deviation.   Cardiovascular:      Rate and Rhythm: Normal rate and regular rhythm.      Heart sounds: Normal heart sounds. No murmur heard.   No gallop.    Pulmonary:      Effort: Pulmonary effort is normal. No respiratory distress.      Breath sounds: Normal breath sounds. No stridor. No wheezing or rales.   Chest:      Chest wall: No tenderness.   Abdominal:      General: Bowel sounds are normal. There is no distension.      Palpations: Abdomen is soft. There is no mass.      Tenderness: There is no abdominal tenderness. There is no guarding or rebound.   Musculoskeletal:         General: No tenderness. Normal range of motion.      Cervical back: Normal range of motion and neck supple.   Lymphadenopathy:      Cervical: No cervical adenopathy.   Skin:     General: Skin is warm.      Coloration: Skin is not pale.      Findings: No erythema or rash.   Neurological:      Mental Status: He is alert and oriented to person, place, and time.   Psychiatric:         Behavior: Behavior normal.         ED Course        Procedures                  No results found for this or any previous visit (from the past 24 hour(s)).    Medications   cocaine nasal solution SOLN ( Topical Given 8/6/21 2034)   tranexamic acid (CYKLOKAPRON) spray 500 mg (500 mg Both Nostrils Given 8/6/21 2230)   0.9% sodium chloride BOLUS (0 mLs Intravenous Stopped 8/7/21 0045)   tranexamic acid 1 g in 100 mL 0.7% NaCl IV bag (premix) (1 g Intravenous Given 8/7/21 0045)   cocaine nasal solution SOLN ( Topical Given 8/7/21 0150)   morphine (PF) injection 2 mg (2 mg Intravenous Given 8/7/21 0308)   ipratropium - albuterol 0.5 mg/2.5 mg/3 mL (DUONEB) neb solution 3 mL (3 mLs Nebulization Given 8/7/21 9159)       Assessments & Plan (with Medical Decision Making)   Fell on face, he has parkinson and chronic balance problem, no new change in his balance today, no LOC,    AAox4 in ER, nasal deformity and large amount of bleeding was noticed  Cocaine cottons applied in nostril, CT head, facial ordered.  CT head , facial vrad : 1. Acute comminuted displaced fractures of the nasal bones.  2. Acute comminuted displaced fracture of the nasal septum with slight deviation to the right.  3. Comminuted displaced fractures of the bilateral anterior, inferior and posterior walls of the maxillary sinuses.4. Hemorrhagic fluid opacifying the bilateral maxillary sinuses.  5. Prominent mucosal thickening of the ethmoid air cells and small air-fluid levels of the sphenoid  I applied     Pt continued to have epistaxis after applying cocaine and also TXA cotton balls, no bed available in [facility],  I consulted with ENT in [facility] , ENT on call , she did not recommended nasal packing due to multiple facial fx, recommended trial of surgi foam that was not available in our ER, IV TXA given, bleeding slightly slowed down but continued  bleeding, pt had large blood vomit multiple times, I consulted with Dr Smith in [facility] that recommended transfer to that center, no ground transportation was available, pt transferred via air after long delay  I have reviewed the nursing notes.    I have reviewed the findings, diagnosis, plan and need for follow up with the patient.      Discharge Medication List as of 8/7/2021  6:12 AM          Final diagnoses:   Multiple closed fractures of facial bone, initial encounter ([facility])   Injury of head, initial encounter   Nasal bleeding       8/6/2021   [facility] EMERGENCY DEPARTMENT     Blayne Molina MD  08/08/21 0418

## 2021-08-07 NOTE — ED NOTES
Bleeding has slowed at this time and no further emesis at this time. Pt has an ice pack on his forehead at this time for comfort and swelling. It is noted the bottom of the cheeks are bruised at this time also and there is increased bruising around the eyes.

## 2021-08-07 NOTE — ED NOTES
Pt continues to deny need for pain medication. Pt reports that the ice pack is still cool enough that it feels the best on his face right now.

## 2021-08-07 NOTE — ED NOTES
"Morphine given at this time as pt has c/o a headache. To this point he has refused anything for pain stating that he is \"fine, I can handle the pain\".  "

## 2021-09-21 LAB — HOLD SPECIMEN: NORMAL

## 2023-05-24 ENCOUNTER — MEDICAL CORRESPONDENCE (OUTPATIENT)
Dept: MAMMOGRAPHY | Facility: HOSPITAL | Age: 78
End: 2023-05-24

## 2023-05-25 ENCOUNTER — MEDICAL CORRESPONDENCE (OUTPATIENT)
Dept: HEALTH INFORMATION MANAGEMENT | Facility: HOSPITAL | Age: 78
End: 2023-05-25

## 2023-06-14 ENCOUNTER — HOSPITAL ENCOUNTER (OUTPATIENT)
Dept: ULTRASOUND IMAGING | Facility: HOSPITAL | Age: 78
Discharge: HOME | End: 2023-06-14
Attending: NURSE PRACTITIONER
Payer: COMMERCIAL

## 2023-06-14 ENCOUNTER — HOSPITAL ENCOUNTER (OUTPATIENT)
Dept: MAMMOGRAPHY | Facility: OTHER | Age: 78
Discharge: HOME | End: 2023-06-14
Attending: NURSE PRACTITIONER
Payer: COMMERCIAL

## 2023-06-14 DIAGNOSIS — Z12.31 ENCOUNTER FOR SCREENING MAMMOGRAM FOR MALIGNANT NEOPLASM OF BREAST: ICD-10-CM

## 2023-06-14 PROCEDURE — 76642 ULTRASOUND BREAST LIMITED: CPT | Mod: LT

## 2023-06-14 PROCEDURE — G0279 TOMOSYNTHESIS, MAMMO: HCPCS | Mod: TC | Performed by: RADIOLOGY

## 2023-06-14 PROCEDURE — 77066 DX MAMMO INCL CAD BI: CPT | Mod: TC | Performed by: RADIOLOGY

## 2024-02-02 NOTE — ED NOTES
Previous packing removed by Dr. Molina, bleeding started immediately after. New cotton balls wih liquid cocaine placed by Dr. Molina. Pt continues to have bloody emesis. At this time he has had four episodes.    Universal Safety Interventions

## 2025-03-20 ENCOUNTER — ANCILLARY PROCEDURE (OUTPATIENT)
Dept: GENERAL RADIOLOGY | Facility: OTHER | Age: 80
End: 2025-03-20
Attending: NURSE PRACTITIONER
Payer: MEDICARE

## 2025-03-20 DIAGNOSIS — M25.551 RIGHT HIP PAIN: ICD-10-CM

## 2025-03-20 PROCEDURE — 73501 X-RAY EXAM HIP UNI 1 VIEW: CPT | Mod: TC | Performed by: FAMILY MEDICINE

## 2025-03-20 PROCEDURE — 73502 X-RAY EXAM HIP UNI 2-3 VIEWS: CPT | Mod: TC

## (undated) DEVICE — BETADINE 5% STERILE OPHTHALMIC SOLUTION 1 OZ.

## (undated) DEVICE — STERI-STRIP-1/2" X 4"

## (undated) DEVICE — BIN-CATARACT BIN

## (undated) DEVICE — INSTRUMENT WIPE-VISIWIPE

## (undated) DEVICE — DRSG-TEGADERM MEDIUM #1626

## (undated) DEVICE — PACK-PHACO STELLARIS

## (undated) DEVICE — KNIFE-X STAR SLIT 2.4MM

## (undated) DEVICE — CANNULA-NUCLEUS HYDRODISSECTOR

## (undated) DEVICE — BIN-TECNIS DCB00 LENSES

## (undated) DEVICE — GLV-7.5 PROTEXIS PI CLASSIC LF/PF

## (undated) DEVICE — HANDPIECE-CAPSULEGUARD I/A STELLARIS

## (undated) DEVICE — PHACO NEEDLE-MICS 2.0 STRAIGHT

## (undated) DEVICE — Device

## (undated) DEVICE — KNIFE-MICRO UNITOME 5.0MM

## (undated) DEVICE — PACK-EYE-CUSTOM

## (undated) DEVICE — BIN-LENS IMPLANT CART

## (undated) DEVICE — PHACO ACCESSORY PACK 2.2MM

## (undated) DEVICE — CYSTOTOME-IRRIGATING  25G

## (undated) DEVICE — GLV-7.0 PROTEXIS PI CLASSIC LF/PF

## (undated) RX ORDER — WATER 10 ML/10ML
INJECTION INTRAMUSCULAR; INTRAVENOUS; SUBCUTANEOUS
Status: DISPENSED
Start: 2018-01-02